# Patient Record
Sex: MALE | Race: OTHER | Employment: PART TIME | URBAN - METROPOLITAN AREA
[De-identification: names, ages, dates, MRNs, and addresses within clinical notes are randomized per-mention and may not be internally consistent; named-entity substitution may affect disease eponyms.]

---

## 2017-07-24 ENCOUNTER — TELEPHONE (OUTPATIENT)
Dept: INTERNAL MEDICINE CLINIC | Facility: CLINIC | Age: 22
End: 2017-07-24

## 2017-07-24 NOTE — TELEPHONE ENCOUNTER
Actions Requested: pt states has had elevated b/p today, the most recent is 190/118. Reports nausea/fatigue. Denies SOB, chest pain, difficulty breathing. Pt states he also has hx of kidney disease. Advised pt to go to ER now and agrees to go now.   Christian

## 2018-04-13 ENCOUNTER — OFFICE VISIT (OUTPATIENT)
Dept: FAMILY MEDICINE CLINIC | Facility: CLINIC | Age: 23
End: 2018-04-13

## 2018-04-13 ENCOUNTER — APPOINTMENT (OUTPATIENT)
Dept: LAB | Age: 23
End: 2018-04-13
Attending: FAMILY MEDICINE
Payer: COMMERCIAL

## 2018-04-13 VITALS
TEMPERATURE: 97 F | RESPIRATION RATE: 14 BRPM | BODY MASS INDEX: 21.18 KG/M2 | HEIGHT: 69 IN | SYSTOLIC BLOOD PRESSURE: 129 MMHG | DIASTOLIC BLOOD PRESSURE: 82 MMHG | HEART RATE: 68 BPM | WEIGHT: 143 LBS

## 2018-04-13 DIAGNOSIS — R79.89 ELEVATED LFTS: ICD-10-CM

## 2018-04-13 DIAGNOSIS — F10.10 ALCOHOL ABUSE: ICD-10-CM

## 2018-04-13 DIAGNOSIS — L98.9 FACIAL SKIN LESION: Primary | ICD-10-CM

## 2018-04-13 PROCEDURE — 80076 HEPATIC FUNCTION PANEL: CPT

## 2018-04-13 PROCEDURE — 36415 COLL VENOUS BLD VENIPUNCTURE: CPT

## 2018-04-13 PROCEDURE — 99214 OFFICE O/P EST MOD 30 MIN: CPT | Performed by: FAMILY MEDICINE

## 2018-04-13 PROCEDURE — 99212 OFFICE O/P EST SF 10 MIN: CPT | Performed by: FAMILY MEDICINE

## 2018-04-13 RX ORDER — CLONIDINE HYDROCHLORIDE 0.2 MG/1
2 TABLET ORAL DAILY
COMMUNITY
Start: 2018-03-16

## 2018-04-13 RX ORDER — AMLODIPINE BESYLATE 2.5 MG/1
1 TABLET ORAL DAILY
COMMUNITY
Start: 2018-03-16

## 2018-04-13 RX ORDER — METOPROLOL SUCCINATE 100 MG/1
1 TABLET, EXTENDED RELEASE ORAL DAILY
COMMUNITY
Start: 2018-03-16

## 2018-04-16 NOTE — PROGRESS NOTES
HPI:    Laurette Shone is a 23year old male presents to clinic with concerns regarding a facial lesion. Patient says he has a red naresh on his face for at least a few years, it is getting larger. Denies itching, pain, or drainage.  Would like to know w Location: Left arm   Patient Position: Sitting   Cuff Size: adult   Pulse: 68   Resp: 14   Temp: (!) 96.5 °F (35.8 °C)   TempSrc: Tympanic   Weight: 143 lb (64.9 kg)   Height: 5' 9\" (1.753 m)      Physical Exam   Constitutional: He is well-developed, well

## 2018-04-18 ENCOUNTER — TELEPHONE (OUTPATIENT)
Dept: FAMILY MEDICINE CLINIC | Facility: CLINIC | Age: 23
End: 2018-04-18

## 2018-04-18 NOTE — TELEPHONE ENCOUNTER
----- Message from Kee Flores MD sent at 4/17/2018 10:06 AM CDT -----  Please inform patient that AST is elevated, he needs to cut down on alcohol intake, recheck in 4 weeks.  Thanks

## 2018-05-01 NOTE — TELEPHONE ENCOUNTER
Patient calling - verified patient's name and  - informed patient of doctor's note - patient verbalized understanding, will have blood test in a few weeks.     Notes recorded by Matty Max MD on 2018 at 10:06 AM CDT  Please inform patient that

## 2020-11-05 PROBLEM — F10.230 ALCOHOL WITHDRAWAL SYNDROME WITHOUT COMPLICATION (HCC): Status: ACTIVE | Noted: 2020-11-05

## 2020-11-05 PROBLEM — I10 ESSENTIAL HYPERTENSION: Status: ACTIVE | Noted: 2020-11-05

## 2020-11-05 PROBLEM — K70.10 ACUTE ALCOHOLIC HEPATITIS: Status: ACTIVE | Noted: 2020-11-05

## 2020-11-05 NOTE — ED INITIAL ASSESSMENT (HPI)
Patient states he would like help with alcohol detox. Having shaking, feeling clammy for the past week. Last drink this morning 0600. Patient normally drinks a pint to 0.5 liter daily. Denies seizures.

## 2020-11-05 NOTE — ED PROVIDER NOTES
Patient Seen in: [facility] AND [facility]      History   No chief complaint on file.     Stated Complaint: ETOH detox    HPI    The patient is a 17-year-old male with a history of type 1 diabetes, kidney disease, Crohn's disease and diabetes Physical Exam  Vitals signs and nursing note reviewed. Constitutional:       General: He is not in acute distress. Appearance: Normal appearance. He is well-developed. He is not ill-appearing. HENT:      Head: Normocephalic and atraumatic. limits   RBC MORPHOLOGY SCAN - Abnormal; Notable for the following components:    RBC Morphology See morphology below (*)     All other components within normal limits   CBC W/ DIFFERENTIAL - Abnormal; Notable for the following components:    RBC 3.94 (*) Reviewed: 4/16/2018          ICD-10-CM Noted POA    Al[facility] O54.134 11/5/2020 Unknown

## 2020-11-05 NOTE — ED NOTES
Pt to the ed with n/v for the past few days. Pt states that he is trying to stay sober but d/t n/v he had a small amt of alcohol today to help with his symptoms. Pt states he also had about one pint of liquor last night.

## 2020-11-06 ENCOUNTER — APPOINTMENT (OUTPATIENT)
Dept: ULTRASOUND IMAGING | Facility: HOSPITAL | Age: 25
DRG: 897 | End: 2020-11-06
Attending: HOSPITALIST
Payer: MEDICAID

## 2020-11-06 PROBLEM — F10.20 ALCOHOL DEPENDENCE, CONTINUOUS (HCC): Status: ACTIVE | Noted: 2020-11-06

## 2020-11-06 PROBLEM — F32.1 MAJOR DEPRESSIVE DISORDER, SINGLE EPISODE, MODERATE (HCC): Status: ACTIVE | Noted: 2020-11-06

## 2020-11-06 PROCEDURE — 76705 ECHO EXAM OF ABDOMEN: CPT | Performed by: HOSPITALIST

## 2020-11-06 NOTE — PROGRESS NOTES
[facility]  Hospitalist Progress Note     Carlos Drop Patient Status:  Inpatient    1995  30year old [facility] [id number]   Location 571/571-A Attending Omari Parrish MD   Hosp Day # 1 PCP Demar Maria, DO     ASSESSMENT/PLAN 37.5* 36.7   RDW 11.6 11.7   NEPRELIM 5.31 2.54   WBC 6.3 3.8*   .0 176.0     Recent Labs   Lab 11/05/20  1539 11/06/20  0817   * 111*   BUN 5* 7   CREATSERUM 1.43* 1.28   GFRAA 77 88   GFRNAA 67 76   CA 9.6 8.6   ALB 4.5 3.3*    148*

## 2020-11-06 NOTE — CONSULTS
[facility]  Report of Consultation    Elenora Lucio Patient Status:  Inpatient    1995 MRN [id number]   Location [facility] 5SW/SE Attending Margy Mclain MD   Hosp Day # 1 PCP Bettie Lopez DO     Reason for Consultation:  - a has never smoked. He has never used smokeless tobacco. He reports current alcohol use. He reports current drug use. Drug: Cannabis.     Allergies:    Nsaids                  OTHER (SEE COMMENTS)    Comment:Scared kidney    Medications:    Current Facility-A patient has a history of alcoholism/alcohol abuse who comes in for detox. He has been noted to have elevated liver function tests with an AST greater than ALT consistent with his alcohol use.   With plan to send off basic lab work-up to evaluate for other

## 2020-11-06 NOTE — PAYOR COMM NOTE
--------------  ADMISSION REVIEW     Payor: Claire Hahn #:  [id number]  Authorization Number: [id number]    Admit date: 11/5/20  Admit time: 1612       Admitting Physician: Zach Rocha MD  Attending Physician:  Levar Perla MD  Primary Care P Smoker      Smokeless tobacco: Never Used    Alcohol use: Yes      Comment: daily about half a pint of liquor    Drug use: Yes      Types: Cannabis      Comment: marijuana             Review of Systems    Positive for stated complaint: ETOH detox  Other sy Motor: Tremor present. Deep Tendon Reflexes: Reflexes are normal and symmetric.    Psychiatric:         Judgment: Judgment normal.       Differential diagnosis includes GI bleed, alcohol detox, hepatitis        ED Course     Labs Reviewed   BASIC Gastroenterology on consult for history of Crohn's disease and peptic ulcer disease with elevated liver enzymes.         MDM      Pulse Ox: 99%, Normal, room air    Cardiac Monitor: Pulse Readings from Last 1 Encounters:  11/05/20 : 67  , sinus, Normal testing was negative. Patient will be admitted to the hospital for further management. PAST MEDICAL HISTORY:  Patient reports history of hypertension, alcohol abuse, last hospitalization at [facility] in January 2020.   He has a history of C clubbing, or cyanosis. NEUROLOGIC:  Motor and sensory intact. Cranial nerves II through XII are intact. Tremors were evident earlier, before he received Ativan. ASSESSMENT:    1.    Alcohol withdrawal, requesting detox, with heavy alcohol abuse and Intravenous Cathy Kemp, RN      ondansetron HCl [facility]) injection 4 mg     Date Action Dose Route User    11/5/2020 1543 Given 4 mg Intravenous Leidy Mackay RN      0.9% NaCl infusion     Date Action Dose Route User    11/6/2020 1145 Rate/Dose Ch pain is better. No chest pain or shortness of breath.     OBJECTIVE  Temp:  [98.1 °F (36.7 °C)-99 °F (37.2 °C)] 98.1 °F (36.7 °C)  Pulse:  [67-91] 75  Resp:  [16-20] 18  BP: (123-168)/() 162/104  Gen: A+Ox3. No distress.    HEENT: NCAT, neck supple,

## 2020-11-06 NOTE — H&P
[facility]    PATIENT'S NAME: Mccray    ATTENDING PHYSICIAN: Jack Baldwin.  Marin Niño MD   PATIENT ACCOUNT#:   [id number]    LOCATION:  [place] [address]  MEDICAL RECORD #:   [id number]       YOB: 1995  ADMISSION DATE:       6 history of Crohn's disease. He denies any fever or chills. Other 12-point review of systems is negative. PHYSICAL EXAMINATION:    GENERAL:  Alert. Oriented to time, place, and person. Moderate distress.    VITAL SIGNS:  Temperature 98.9, pulse 68,

## 2020-11-06 NOTE — BH PROGRESS NOTE
Behavioral Health Note:  REASON FOR ADMISSION:   Alcohol withdrawal     REASON FOR CONSULT:  Psychiatry Consultation requested for evaluation and advice d/t DAJA     OBJECTIVE:  Yadira Ellsworth is a 30year old male with PMH significant for Crohn's disease, alcoholi reports that he had been thinking about engaging in outpatient CD/Dual dx and was open to referrals.     PAST PSYCHIATRIC HISTORY:  Past diagnosis: alcohol use disorder  Past inpatient: no inpt MH tx, hx medical detox at [facility] 1/2020  Past outpatient [address] [facility], [address] [place] [address]

## 2020-11-06 NOTE — PLAN OF CARE
Problem: Patient Centered Care  Goal: Patient preferences are identified and integrated in the patient's plan of care  Description: Interventions:  - What would you like us to know as we care for you?  This is my second time being in the hospital for [address] [place] [address]

## 2020-11-06 NOTE — ED NOTES
Orders for admission, patient is aware of plan and ready to go upstairs. Any questions, please call ED RN Fide Escalante at extension [phone].    Type of COVID test sent:  COVID Suspicion level: Low/High    Titratable drug(s) infusing:  Rate:    LOC at time of trans

## 2020-11-07 NOTE — CONSULTS
[facility]    Report of Consultation    Radha Roy Patient Status:  Inpatient    1995 MRN [id number]   Location [facility] 5SW/SE Attending Jeanette Snowden MD   Hosp Day # 1 PCP Heriberto Dandy, DO     Date of Admissio has been feeling nervous and  stress reporting psychosocial stressors including work (understaffed), his finances, and acclimating to moving out of his childhood home for the first time.  He reports his father passed away in 10/2019 and he's helping his mom Medical History:   Diagnosis Date   • Crohn's disease ([facility]    • Kidney disease     kidney disease   • Type I (juvenile type) diabetes mellitus without mention of complication, not stated as uncontrolled 1995    diabetes insipidus.   Followed by peds endocr Daily    •  multivitamin with minerals (ADULT) tab 1 tablet, 1 tablet, Oral, Daily    •  folic acid (FOLVITE) tab 1 mg, 1 mg, Oral, Daily        •  cloNIDine HCl 0.2 MG Oral Tab, Take 0.2 mg by mouth.     •  Metoprolol Succinate  MG Oral Tablet 24 Hr, alert and oriented x3 and aware of his medical condition. Gait: Intact. Attitude/Coorperation: Patient presented cooperative and attentive. Behavior: tremor in bilateral hands, fair eye contact.   Speech: Regular rate and rhythm and speech with no pressu With Differential With Platelet      Basic Metabolic Panel (8)      Hepatic Function Panel (7)      Urinalysis with Culture Reflex STAT      RBC Morphology Scan      Magnesium      CBC With Differential With Platelet      Comp Metabolic Panel (14)      Mag

## 2020-11-07 NOTE — PROGRESS NOTES
[facility]     Gastroenterology Progress Note    Jolie Kebede Patient Status:  Inpatient    1995 MRN [id number]   Location [facility] 5SW/SE Attending Jefferson Lopez MD   [facility] Day # 2 PCP Flavio Rodarte DO at [facility]) not on any medication/treatment for this at this time, alcohol use, renal insufficiency, diabetes, hypertension, admitted 11/5 for alcohol withdrawal    Clinically appears well at this time, LFTs improving and without symptoms acutely that

## 2020-11-07 NOTE — PLAN OF CARE
Problem: PAIN - ADULT  Goal: Verbalizes/displays adequate comfort level or patient's stated pain goal  Description: INTERVENTIONS:  - Encourage pt to monitor pain and request assistance  - Assess pain using appropriate pain scale  - Administer analgesics patient's ability to be responsible for managing their own health  - Refer to Case Management Department for coordinating discharge planning if the patient needs post-hospital services based on physician/LIP order or complex needs related to functional sta

## 2020-11-07 NOTE — PLAN OF CARE
Problem: Patient Centered Care  Goal: Patient preferences are identified and integrated in the patient's plan of care  Description: Interventions:  - What would you like us to know as we care for you?  This is my second time being in the hospital for [address] [place] [address] injury  Description: INTERVENTIONS:  - Assess pt frequently for physical needs  - Identify cognitive and physical deficits and behaviors that affect risk of falls.   - [place] fall precautions as indicated by assessment.  - Educate pt/family on patient sa

## 2020-11-08 NOTE — PROGRESS NOTES
Shayla Galdamez is a 30year old  male with history of type 1 diabetes, kidney disease, Crohn's disease and history of alcohol dependence presented to the hospital requesting detox.     I spent a total of 35 minutes with the patient, greater than Relation Age of Onset   • Heart Disease Maternal Grandmother 52        CAD (cause of death)   • Colon Cancer Father 52        survived   • Cancer Maternal Grandfather         thyroid cancer      Social History: Social History    Tobacco Use      Smoking st any  Homicidal ideation: Denied any  Thought content: Fixating on going home  Perceptions: Denies any auditory or visual hallucination  Memory: Intact  Language: Intact  Intellect: Average  Insight/Judgement: Limited for patient refusing any medication man (14)      Hepatitis A B + C profile      Ferritin      Iron And Tibc      Rapid SARS-CoV-2 by PCR STAT      Meds This Visit:  Requested Prescriptions      No prescriptions requested or ordered in this encounter         11/7/2020  Deyvi Schneider MD

## 2020-11-08 NOTE — DISCHARGE SUMMARY
[facility] ([place] [place]) HOSPITALIST  DISCHARGE SUMMARY     Theodora Casas Patient Status:  Inpatient    1995 MRN [id number]   Location [facility] 5SW/SE Attending No att. providers found   Hosp Day # 2 PCP Mike Calles DO     DATE OF ADMISSION:  MS: No joint effusions. No peripheral edema. Skin: Skin is warm and dry. No rashes, erythema, diaphoresis. Psych: Normal mood and affect.  Behavior and judgment normal.     DISCHARGE MEDICATIONS     Discharge Medications      CHANGE how you take these Risk  0-28   Low Risk. TCM Follow-Up Recommendation:  LACE 29-58:  Moderate Risk of readmission after discharge from the hospital.

## 2020-11-09 NOTE — PAYOR COMM NOTE
--------------  DISCHARGE REVIEW    Payor: Rafa Castellano #:  [id number]  Authorization Number: [id number]    Admit date: 11/5/20  Admit time:  1920  Discharge Date: 11/7/2020  4:28 PM     Admitting Physician: Sandra Junior MD  Attending Physician:  Naz Polo follow-up. Patient understands return to the emergency room for increased pain, fever, discharge, shortness of breath, chest pain, new neurologic symptoms, other concerning symptoms.     PHYSICAL EXAM:  Pulse:  [76] 76  Resp:  [20] 20  BP: (149)/(102) 14 GASTROENTEROLOGY          FOLLOW UP:  Lidia Villa DO  [facility] [place] [id number]  [phone]    In 1 week      MD [address] [address] [phone]    In 2 weeks  Post Discharge Followup

## 2020-11-11 ENCOUNTER — TELEPHONE (OUTPATIENT)
Dept: GASTROENTEROLOGY | Facility: CLINIC | Age: 25
End: 2020-11-11

## 2020-11-11 NOTE — TELEPHONE ENCOUNTER
----- Message from Collette Hansen MD sent at 11/10/2020  5:41 PM CST -----  Lab work ok, RN to see if the pt has a follow up with Edilma Love for monitor LFTs ( history of Etoh) and Crohns disease

## 2020-11-12 NOTE — TELEPHONE ENCOUNTER
Dr. Kerwin Hahn    Patient's insurance is not in [facility]. Should patient follow up with Gi provider in his network since no further testing/workup could be ordered during the one post hospital office visit that we are allotted?     Please advise

## 2020-11-13 NOTE — TELEPHONE ENCOUNTER
I spoke to the patient and reviewed the below result note. He voiced understanding and that he will follow up with a gastroenterologist within network.

## 2022-03-04 NOTE — PLAN OF CARE
Problem: Patient Centered Care  Goal: Patient preferences are identified and integrated in the patient's plan of care  Description: Interventions:  - What would you like us to know as we care for you?  This is my second time being in the hospital for [address] [place] [address] physical needs  - Identify cognitive and physical deficits and behaviors that affect risk of falls.   - [place] fall precautions as indicated by assessment.  - Educate pt/family on patient safety including physical limitations  - Instruct pt to call for a Keystone Flap Text: The defect edges were debeveled with a #15 scalpel blade.  Given the location of the defect, shape of the defect a keystone flap was deemed most appropriate.  Using a sterile surgical marker, an appropriate keystone flap was drawn incorporating the defect, outlining the appropriate donor tissue and placing the expected incisions within the relaxed skin tension lines where possible. The area thus outlined was incised deep to adipose tissue with a #15 scalpel blade.  The skin margins were undermined to an appropriate distance in all directions around the primary defect and laterally outward around the flap utilizing iris scissors.

## (undated) NOTE — LETTER
4/21/2018              [address] [place]         Dear Josh Parra,    This letter is to inform you that our office has made several attempts to reach you by phone without success.   We were attempting to contact